# Patient Record
(demographics unavailable — no encounter records)

---

## 2025-01-03 NOTE — HISTORY OF PRESENT ILLNESS
[de-identified] : Patient is a 81 year old male who presents today for an initial evaluation of neck pain and shoulder pain that has been present for 6 months. He denies radicular sxs. He reports that his pain is exacerbated by turning his neck. He states that he feels tired after washing dishes for five minutes and reports that he has to lay down to improve his pain. He denies any issues with balance or dropping objects.

## 2025-01-03 NOTE — PHYSICAL EXAM
[de-identified] :  Cervical Physical Exam   difficulty with tandem gait Gait - Normal   Station - Normal   Sagittal balance - Normal   Compensatory mechanism? - None   Horizontal gaze - Maintained     Reflexes Biceps - Normal Triceps - Normal Brachioradialis - Normal Patellar - Normal Gastroc - Normal Clonus -No   Hoffmans - positive bilateral   Shoulder Exam - Normal   Spurlings - None   Wrist Pulses -2+ radial/ulnar   Foot Pulses -2+ DP/PT   Cervical range of motion - Normal   Sensation C5-T1 sensation intact to light touch bilaterally   L1-S1 sensation intact to light touch bilaterally   Motor   Deltoid Biceps Triceps WF WE IO  Right 5/5 5/5 3+/5 5/5 5/5 5/5 4/5 Left 5/5 5/5 3+/5 5/5 5/5 5/5 4/5   IP Quad HS TA Gastroc EHL Right 5/5 5/5 5/5 5/5 5/5 5/5 Left 5/5 5/5 5/5 5/5 5/5 5/5 [de-identified] : cervical radiographs osteoarthritis

## 2025-01-03 NOTE — ASSESSMENT
[FreeTextEntry1] :  I had a lengthy discussion with the patient in regards to the treatment plan and diagnosis. I am concerned in regards to compression along the patient's spinal cord and/or nerve roots.  As a result I would like to proceed with an MRI of the patient's cervical spine.  In tandem with this the patient should begin physical therapy focused on their neck and the patient can take Diclofenac, Tizanidine and Tylenol. I will have the patient follow-up in 2 to 3 weeks for repeat clinical evaluation.  I encouraged the patient to reach out to me directly at any point if their symptoms worsen or change in any way.

## 2025-01-03 NOTE — ADDENDUM
[FreeTextEntry1] :  I, Viola Kerr, acted solely as a scribe for Dr. Kashif Quinones MD on this date 01/03/2025    All medical record entries made by the Scribe were at my, Dr. Kashif Quinones MD., direction and personally dictated by me on 01/03/2025 . I have reviewed the chart and agree that the record accurately reflects my personal performance of the history, physical exam, assessment and plan. I have also personally directed, reviewed, and agreed with the chart.

## 2025-01-19 NOTE — HISTORY OF PRESENT ILLNESS
[de-identified] : Patient is a 81 year old male who presents today for a follow up evaluation of neck pain and shoulder pain that has been present for 6 months. He reports neck pain when turning his head. Patient reports that his pain is exacerbated by sitting straight in the car. He presents in a cervical collar. When he is in the collar, pain is controlled.  01.03.25 Patient is a 81 year old male who presents today for an initial evaluation of neck pain and shoulder pain that has been present for 6 months. He denies radicular sxs. He reports that his pain is exacerbated by turning his neck. He states that he feels tired after washing dishes for five minutes and reports that he has to lay down to improve his pain. He denies any issues with balance or dropping objects.

## 2025-01-19 NOTE — DISCUSSION/SUMMARY
[FreeTextEntry1] : Spoke with family, will send to [facility] for further workup of lesion noted on MRI. Family will bring patient in tomorrow. All questions answered

## 2025-01-19 NOTE — HISTORY OF PRESENT ILLNESS
[de-identified] : Patient is a 81 year old male who presents today for a follow up evaluation of neck pain and shoulder pain that has been present for 6 months. He reports neck pain when turning his head. Patient reports that his pain is exacerbated by sitting straight in the car. He presents in a cervical collar. When he is in the collar, pain is controlled.  01.03.25 Patient is a 81 year old male who presents today for an initial evaluation of neck pain and shoulder pain that has been present for 6 months. He denies radicular sxs. He reports that his pain is exacerbated by turning his neck. He states that he feels tired after washing dishes for five minutes and reports that he has to lay down to improve his pain. He denies any issues with balance or dropping objects.

## 2025-01-19 NOTE — ADDENDUM
[FreeTextEntry1] :  I, Viola Kerr, acted solely as a scribe for Dr. Kashif Quinones MD on this date 01/17/2025    All medical record entries made by the Scribe were at my, Dr. Kashif Quinones MD., direction and personally dictated by me on 01/17/2025 . I have reviewed the chart and agree that the record accurately reflects my personal performance of the history, physical exam, assessment and plan. I have also personally directed, reviewed, and agreed with the chart.

## 2025-01-19 NOTE — PHYSICAL EXAM
[de-identified] :  Cervical Physical Exam   Nml gait   Station - Normal   Sagittal balance - Normal   Compensatory mechanism? - None   Horizontal gaze - Maintained     Reflexes Biceps - Normal Triceps - Normal Brachioradialis - Normal Patellar - Normal Gastroc - Normal Clonus -No   Hoffmans - positive bilateral   Shoulder Exam - Normal   Spurlings - None   Wrist Pulses -2+ radial/ulnar   Foot Pulses -2+ DP/PT   Cervical range of motion - in collar   Sensation C5-T1 sensation intact to light touch bilaterally   L1-S1 sensation intact to light touch bilaterally   Motor   Deltoid Biceps Triceps WF WE IO  Right 5/5 5/5 5/5 5/5 5/5 5/5 5/5 Left 5/5 5/5 5/5 5/5 5/5 5/5 5/5   IP Quad HS TA Gastroc EHL Right 5/5 5/5 5/5 5/5 5/5 5/5 Left 5/5 5/5 5/5 5/5 5/5 5/5 [de-identified] : cervical MRI C1/C2 lesion noted

## 2025-01-19 NOTE — DISCUSSION/SUMMARY
[FreeTextEntry1] : Spoke with family, will send to [facility] for further workup of lesion noted on MRI. Family will bring patient in tomorrow. All questions answered

## 2025-01-19 NOTE — PHYSICAL EXAM
[de-identified] :  Cervical Physical Exam   Nml gait   Station - Normal   Sagittal balance - Normal   Compensatory mechanism? - None   Horizontal gaze - Maintained     Reflexes Biceps - Normal Triceps - Normal Brachioradialis - Normal Patellar - Normal Gastroc - Normal Clonus -No   Hoffmans - positive bilateral   Shoulder Exam - Normal   Spurlings - None   Wrist Pulses -2+ radial/ulnar   Foot Pulses -2+ DP/PT   Cervical range of motion - in collar   Sensation C5-T1 sensation intact to light touch bilaterally   L1-S1 sensation intact to light touch bilaterally   Motor   Deltoid Biceps Triceps WF WE IO  Right 5/5 5/5 5/5 5/5 5/5 5/5 5/5 Left 5/5 5/5 5/5 5/5 5/5 5/5 5/5   IP Quad HS TA Gastroc EHL Right 5/5 5/5 5/5 5/5 5/5 5/5 Left 5/5 5/5 5/5 5/5 5/5 5/5 [de-identified] : cervical MRI C1/C2 lesion noted

## 2025-01-19 NOTE — ASSESSMENT
[FreeTextEntry1] :  I had a lengthy discussion with the patient in regards to the treatment plan and diagnosis. At this point we will await pathology results. Discussed transitioning care to neurosurgery pending pathology results. Discussed red flag findings that if present would require immediate return to the hospital. Patient and family appreciative of care.

## 2025-02-04 NOTE — ASSESSMENT
[FreeTextEntry1] : Mr. Peter Castaneda is an 81-year-old man diagnosed with a cervical tumor and inflammatory pannus, presenting with severe neck pain but an intact neurological exam.   Imaging and diagnostic workup evaluation show evidence of There are destructive changes involving the dens with suspicion for superimposed nondisplaced pathologic fracture. Expansile/soft tissue component involving the left posterior dense results in mild to moderate central canal narrowing. Differential considerations include inflammatory etiology as well as possible neoplasm. Comparison with prior imaging and imaging follow-up is recommended. There is T2 signal hyperintensity within the cervical cord extending from the C3-C4 level through the C4-C5 level, likely keeping with myelomalacia. Multilevel discogenic degenerative disease and facet arthropathy of the cervical spine, most pronounced at C4-C5 and C5-C6 where there is moderate bilateral neural foraminal narrowing and moderate central canal narrowing. Additional varying degrees of central canal and neural foraminal narrowing, as above.   Plan:  -CT Guided Biopsy by Dr. Javier Chauhan -MRI CERVICAL SPINE at C1-C2 Cranio cervical junction -CTA HEAD and NECK assess vascular  -Cervical Xray to assess for instability flex ext and open mouth views -Lymphoma labs   Follow up: after completion of Biopsy and diagnostic results   Please see Dr. Cerda's dictation for details. I, Dr. Valencia Cerda evaluated the patient with the nurse practitioner Guanakito Diggs and established the plan of care. I discussed this patient with the nurse practitioner during the visit. I agree with the assessment and plan as written unless noted below.

## 2025-02-04 NOTE — HISTORY OF PRESENT ILLNESS
[FreeTextEntry1] : Neck pain [de-identified] : Mr. Peter Castaneda is an 81-year-old man with a medical history that includes hypertension (HTN), glaucoma, and rheumatoid arthritis (RA). He is presenting for a comprehensive neurosurgical evaluation of his cervical spine following a request from the hospital for follow-up care.  Today, the patient reports that his symptoms began while he was on vacation in his native country, [place]. He is currently wearing a rigid cervical collar. He notes that his neck pain worsens with movement and significantly impacts his quality of life (QOL).   Hospital Course: Discharge Date 14-Jan-2025 Admission Date 08-Jan-2025 14:03 Reason for Admission Workup for possible c-spine neoplasm Hospital Course  80yo male with PMH of HTN, glaucoma, and RA, presented to outpatient ortho with neck pain x 6months, found to have possible neoplasm in c-spine, admitted for further workup. MRI from outpatient notable for destructive changes in the dens suspicious for superimposed nondisplaced pathologic fracture with expansile/soft tissue component involving the L posterior dens. Ortho was consulted, no surgical intervention indicated. Patient was placed in a c-collar. Onc was consulted, CT C/A/P showed enlarged bilateral axillary and subpectoral LN suspicious for possible mets. Rad onc consulted for palliative RT, IR consulted for biopsy. MRI/C/T/L spine with contrast showing pathologic C2 fracture with epidural paraspinal extension, MRI brain no evidence of intracranial mets. He received US guided axillary LN biopsy with IR on 1/14. Patient was continued on his home hypertension meds including amlodipine 10mg qd and lisinopril 20mg qd. On day of discharge, patient is clinically stable with no new exam findings or acute symptoms compared to prior. The patient was seen by the attending physician on the date of discharge and deemed stable and acceptable for discharge. The patient's chronic medical conditions were treated accordingly per the patient's home medication regimen. The patient's medication reconciliation (with changes made to chronic medications), follow up appointments, discharge orders, instructions, and significant lab and diagnostic studies are as noted.

## 2025-02-04 NOTE — HISTORY OF PRESENT ILLNESS
[FreeTextEntry1] : Neck pain [de-identified] : Mr. Peter Castaneda is an 81-year-old man with a medical history that includes hypertension (HTN), glaucoma, and rheumatoid arthritis (RA). He is presenting for a comprehensive neurosurgical evaluation of his cervical spine following a request from the hospital for follow-up care.  Today, the patient reports that his symptoms began while he was on vacation in his native country, [facility]. He is currently wearing a rigid cervical collar. He notes that his neck pain worsens with movement and significantly impacts his quality of life (QOL).   Hospital Course: Discharge Date 14-Jan-2025 Admission Date 08-Jan-2025 14:03 Reason for Admission Workup for possible c-spine neoplasm Hospital Course  80yo male with PMH of HTN, glaucoma, and RA, presented to outpatient ortho with neck pain x 6months, found to have possible neoplasm in c-spine, admitted for further workup. MRI from outpatient notable for destructive changes in the dens suspicious for superimposed nondisplaced pathologic fracture with expansile/soft tissue component involving the L posterior dens. Ortho was consulted, no surgical intervention indicated. Patient was placed in a c-collar. Onc was consulted, CT C/A/P showed enlarged bilateral axillary and subpectoral LN suspicious for possible mets. Rad onc consulted for palliative RT, IR consulted for biopsy. MRI/C/T/L spine with contrast showing pathologic C2 fracture with epidural paraspinal extension, MRI brain no evidence of intracranial mets. He received US guided axillary LN biopsy with IR on 1/14. Patient was continued on his home hypertension meds including amlodipine 10mg qd and lisinopril 20mg qd. On day of discharge, patient is clinically stable with no new exam findings or acute symptoms compared to prior. The patient was seen by the attending physician on the date of discharge and deemed stable and acceptable for discharge. The patient's chronic medical conditions were treated accordingly per the patient's home medication regimen. The patient's medication reconciliation (with changes made to chronic medications), follow up appointments, discharge orders, instructions, and significant lab and diagnostic studies are as noted.

## 2025-02-04 NOTE — PHYSICAL EXAM
[General Appearance - Alert] : alert [General Appearance - In No Acute Distress] : in no acute distress [Oriented To Time, Place, And Person] : oriented to person, place, and time [No Visual Abnormalities] : no visible abnormalities [Neck Appearance] : the appearance of the neck was normal [] : no respiratory distress [Heart Rate And Rhythm] : heart rate was normal and rhythm regular [Abnormal Walk] : normal gait

## 2025-02-04 NOTE — HISTORY OF PRESENT ILLNESS
[FreeTextEntry1] : Neck pain [de-identified] : Mr. Peter Castaneda is an 81-year-old man with a medical history that includes hypertension (HTN), glaucoma, and rheumatoid arthritis (RA). He is presenting for a comprehensive neurosurgical evaluation of his cervical spine following a request from the hospital for follow-up care.  Today, the patient reports that his symptoms began while he was on vacation in his native country, [facility]. He is currently wearing a rigid cervical collar. He notes that his neck pain worsens with movement and significantly impacts his quality of life (QOL).   Hospital Course: Discharge Date 14-Jan-2025 Admission Date 08-Jan-2025 14:03 Reason for Admission Workup for possible c-spine neoplasm Hospital Course  82yo male with PMH of HTN, glaucoma, and RA, presented to outpatient ortho with neck pain x 6months, found to have possible neoplasm in c-spine, admitted for further workup. MRI from outpatient notable for destructive changes in the dens suspicious for superimposed nondisplaced pathologic fracture with expansile/soft tissue component involving the L posterior dens. Ortho was consulted, no surgical intervention indicated. Patient was placed in a c-collar. Onc was consulted, CT C/A/P showed enlarged bilateral axillary and subpectoral LN suspicious for possible mets. Rad onc consulted for palliative RT, IR consulted for biopsy. MRI/C/T/L spine with contrast showing pathologic C2 fracture with epidural paraspinal extension, MRI brain no evidence of intracranial mets. He received US guided axillary LN biopsy with IR on 1/14. Patient was continued on his home hypertension meds including amlodipine 10mg qd and lisinopril 20mg qd. On day of discharge, patient is clinically stable with no new exam findings or acute symptoms compared to prior. The patient was seen by the attending physician on the date of discharge and deemed stable and acceptable for discharge. The patient's chronic medical conditions were treated accordingly per the patient's home medication regimen. The patient's medication reconciliation (with changes made to chronic medications), follow up appointments, discharge orders, instructions, and significant lab and diagnostic studies are as noted.

## 2025-02-21 NOTE — HISTORY OF PRESENT ILLNESS
[FreeTextEntry1] : 82yo male with PMH of HTN, glaucoma, and RA. Patient went to ortho for neck pain x 6months, found to have possible neoplasm in c-spine.  Patient is being referred by Dr Diggs for Vertebrae Biopsy Consultation. Patient denies Chst pain, Fever, Chills, N/V/D???? ADL's??? DM use??? PST Diclofenac hold 24 hours prior to procedure???  MR Cervical Spine 1/3/25 There are destructive changes involving the dens with suspicion for superimposed nondisplaced pathologic fracture. Expansile/soft tissue component involving the left posterior dense results in mild to moderate central canal narrowing. Differential considerations include inflammatory etiology as well as possible neoplasm. Comparison with prior imaging and imaging follow-up is recommended. There is T2 signal hyperintensity within the cervical cord extending from the C3-C4 level through the C4-C5 level, likely keeping with myelomalacia. Multilevel discogenic degenerative disease and facet arthropathy of the cervical spine, most pronounced at C4-C5 and C5-C6 where there is moderate bilateral neural foraminal narrowing and moderate central canal narrowing. Additional varying degrees of central canal and neural foraminal narrowing, as above.  MR of spine w/wo contrast 2/13/25 There is grossly no significant interval change in degree of epidural/paraspinal soft tissue at the atlantodental articulation with associated destructive changes of the dens and C1 arches. Soft tissue again is slightly asymmetrically more pronounced on the left and causes mild to moderate mass effect on the left thecal sac. Differential considerations include sequela of pannus formation in the setting of an inflammatory condition such as rheumatoid arthritis. Additional etiologies cannot be entirely excluded, and continued surveillance may be of utility. Multilevel discogenic degenerative disease and facet arthropathy of the cervical spine, resulting in varying degrees of central canal and neural foraminal narrowing, unchanged as compared to 1/10/2025.

## 2025-02-21 NOTE — DATA REVIEWED
[FreeTextEntry1] : CT Angio neck and MR of Cervical spine reviewed with patient and all questions answered.

## 2025-03-03 NOTE — HISTORY OF PRESENT ILLNESS
[FreeTextEntry1] : 82yo male with PMH of HTN, glaucoma, and RA. Patient went to ortho for neck pain since August 2024, found to have possible neoplasm in c-spine.  Neck pain worsen w/movement. Neck Collar in Use remove for ADL and when eating.  Patient is being referred by Dr Diggs for Vertebrae Biopsy Consultation. Patient denies Chest pain, Fever, Chills, N/V/D. Patient is taking aspirin on his own, Patient hold aspirin 5 days prior to procedure. PST ADL self-care, No DME  MR Cervical Spine 1/3/25 There are destructive changes involving the dens with suspicion for superimposed nondisplaced pathologic fracture. Expansile/soft tissue component involving the left posterior dense results in mild to moderate central canal narrowing. Differential considerations include inflammatory etiology as well as possible neoplasm. Comparison with prior imaging and imaging follow-up is recommended. There is T2 signal hyperintensity within the cervical cord extending from the C3-C4 level through the C4-C5 level, likely keeping with myelomalacia. Multilevel discogenic degenerative disease and facet arthropathy of the cervical spine, most pronounced at C4-C5 and C5-C6 where there is moderate bilateral neural foraminal narrowing and moderate central canal narrowing. Additional varying degrees of central canal and neural foraminal narrowing, as above.  MR of spine w/wo contrast 2/13/25 There is grossly no significant interval change in degree of epidural/paraspinal soft tissue at the atlantodental articulation with associated destructive changes of the dens and C1 arches. Soft tissue again is slightly asymmetrically more pronounced on the left and causes mild to moderate mass effect on the left thecal sac. Differential considerations include sequela of pannus formation in the setting of an inflammatory condition such as rheumatoid arthritis. Additional etiologies cannot be entirely excluded, and continued surveillance may be of utility. Multilevel discogenic degenerative disease and facet arthropathy of the cervical spine, resulting in varying degrees of central canal and neural foraminal narrowing, unchanged as compared to 1/10/2025. [de-identified] : 2/13/2025 angio head and neck [de-identified] : 2/13/2025, 1/10/2025, 1/3/2025 cervical spine

## 2025-03-03 NOTE — CONSULT LETTER
[Consult Letter:] : I had the pleasure of evaluating your patient, [unfilled]. [Please see my note below.] : Please see my note below. [Consult Closing:] : Thank you very much for allowing me to participate in the care of this patient.  If you have any questions, please do not hesitate to contact me. [Sincerely,] : Sincerely, [FreeTextEntry3] :  Javier Chauhan MD, FACR, FSIR , Interventional Radiology Residency Associate , Diagnostic Radiology Residency Assistant Professor of Radiology, [employer] at [facility] Vascular & Interventional Radiology, [facility] P: [phone] F: [phone] [facility] P: [phone] F: [phone]

## 2025-03-03 NOTE — CONSULT LETTER
[Consult Letter:] : I had the pleasure of evaluating your patient, [unfilled]. [Please see my note below.] : Please see my note below. [Consult Closing:] : Thank you very much for allowing me to participate in the care of this patient.  If you have any questions, please do not hesitate to contact me. [Sincerely,] : Sincerely, [FreeTextEntry3] :  Javier Chauhan MD, FACR, FSIR , Interventional Radiology Residency Associate , Diagnostic Radiology Residency Assistant Professor of Radiology, [employer] at [facility] Vascular & Interventional Radiology, [facility] P: [phone] F: [phone] [facility] P: [phone] F: [phone]

## 2025-03-03 NOTE — REVIEW OF SYSTEMS
[Fever] : no fever [Chills] : no chills [Nosebleeds] : no nosebleeds [Chest Pain] : no chest pain [Palpitations] : no palpitations [Shortness Of Breath] : no shortness of breath [Wheezing] : no wheezing [Cough] : no cough [Abdominal Pain] : no abdominal pain [Vomiting] : no vomiting [Diarrhea] : no diarrhea [Easy Bleeding] : no tendency for easy bleeding [Easy Bruising] : no tendency for easy bruising

## 2025-03-03 NOTE — ASSESSMENT
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  All questions asked and answered to completion and the patient and his wife's satisfaction.  Risks, benefits, alternatives to the procedure were discussed with the patient and informed consent was obtained.  Plan: 1.  Biopsy of C1-C2 mass. 2.  CT guidance with CT angiogram at the time of the procedure.  3.  General anesthesia by anesthesiology. 4.  Prone positioning with left posterior/posterior lateral approach. 5.  Hold aspirin 5 days prior and 2 days post biopsy. 6.  Will obtain cervical spine clearance from referring neurosurgeon.

## 2025-03-03 NOTE — CONSULT LETTER
[Consult Letter:] : I had the pleasure of evaluating your patient, [unfilled]. [Please see my note below.] : Please see my note below. [Consult Closing:] : Thank you very much for allowing me to participate in the care of this patient.  If you have any questions, please do not hesitate to contact me. [Sincerely,] : Sincerely, [FreeTextEntry3] :  Javier Chauhan MD, FACR, FSIR , Interventional Radiology Residency Associate , Diagnostic Radiology Residency Assistant Professor of Radiology, [employer] at [facility] Vascular & Interventional Radiology, [facility] P: [phone] F: [phone] [facility] P: [phone] F: [phone]

## 2025-03-03 NOTE — HISTORY OF PRESENT ILLNESS
[FreeTextEntry1] : 82yo male with PMH of HTN, glaucoma, and RA. Patient went to ortho for neck pain since August 2024, found to have possible neoplasm in c-spine.  Neck pain worsen w/movement. Neck Collar in Use remove for ADL and when eating.  Patient is being referred by Dr Diggs for Vertebrae Biopsy Consultation. Patient denies Chest pain, Fever, Chills, N/V/D. Patient is taking aspirin on his own, Patient hold aspirin 5 days prior to procedure. PST ADL self-care, No DME  MR Cervical Spine 1/3/25 There are destructive changes involving the dens with suspicion for superimposed nondisplaced pathologic fracture. Expansile/soft tissue component involving the left posterior dense results in mild to moderate central canal narrowing. Differential considerations include inflammatory etiology as well as possible neoplasm. Comparison with prior imaging and imaging follow-up is recommended. There is T2 signal hyperintensity within the cervical cord extending from the C3-C4 level through the C4-C5 level, likely keeping with myelomalacia. Multilevel discogenic degenerative disease and facet arthropathy of the cervical spine, most pronounced at C4-C5 and C5-C6 where there is moderate bilateral neural foraminal narrowing and moderate central canal narrowing. Additional varying degrees of central canal and neural foraminal narrowing, as above.  MR of spine w/wo contrast 2/13/25 There is grossly no significant interval change in degree of epidural/paraspinal soft tissue at the atlantodental articulation with associated destructive changes of the dens and C1 arches. Soft tissue again is slightly asymmetrically more pronounced on the left and causes mild to moderate mass effect on the left thecal sac. Differential considerations include sequela of pannus formation in the setting of an inflammatory condition such as rheumatoid arthritis. Additional etiologies cannot be entirely excluded, and continued surveillance may be of utility. Multilevel discogenic degenerative disease and facet arthropathy of the cervical spine, resulting in varying degrees of central canal and neural foraminal narrowing, unchanged as compared to 1/10/2025. [de-identified] : 2/13/2025 angio head and neck [de-identified] : 2/13/2025, 1/10/2025, 1/3/2025 cervical spine

## 2025-03-03 NOTE — HISTORY OF PRESENT ILLNESS
[FreeTextEntry1] : 82yo male with PMH of HTN, glaucoma, and RA. Patient went to ortho for neck pain since August 2024, found to have possible neoplasm in c-spine.  Neck pain worsen w/movement. Neck Collar in Use remove for ADL and when eating.  Patient is being referred by Dr Diggs for Vertebrae Biopsy Consultation. Patient denies Chest pain, Fever, Chills, N/V/D. Patient is taking aspirin on his own, Patient hold aspirin 5 days prior to procedure. PST ADL self-care, No DME  MR Cervical Spine 1/3/25 There are destructive changes involving the dens with suspicion for superimposed nondisplaced pathologic fracture. Expansile/soft tissue component involving the left posterior dense results in mild to moderate central canal narrowing. Differential considerations include inflammatory etiology as well as possible neoplasm. Comparison with prior imaging and imaging follow-up is recommended. There is T2 signal hyperintensity within the cervical cord extending from the C3-C4 level through the C4-C5 level, likely keeping with myelomalacia. Multilevel discogenic degenerative disease and facet arthropathy of the cervical spine, most pronounced at C4-C5 and C5-C6 where there is moderate bilateral neural foraminal narrowing and moderate central canal narrowing. Additional varying degrees of central canal and neural foraminal narrowing, as above.  MR of spine w/wo contrast 2/13/25 There is grossly no significant interval change in degree of epidural/paraspinal soft tissue at the atlantodental articulation with associated destructive changes of the dens and C1 arches. Soft tissue again is slightly asymmetrically more pronounced on the left and causes mild to moderate mass effect on the left thecal sac. Differential considerations include sequela of pannus formation in the setting of an inflammatory condition such as rheumatoid arthritis. Additional etiologies cannot be entirely excluded, and continued surveillance may be of utility. Multilevel discogenic degenerative disease and facet arthropathy of the cervical spine, resulting in varying degrees of central canal and neural foraminal narrowing, unchanged as compared to 1/10/2025. [de-identified] : 2/13/2025 angio head and neck [de-identified] : 2/13/2025, 1/10/2025, 1/3/2025 cervical spine

## 2025-03-03 NOTE — PHYSICAL EXAM
[Alert] : alert [No Respiratory Distress] : no respiratory distress [Normal Rate] : heart rate was normal  [Normal Gait] : normal gait [Oriented x3] : oriented to person, place, and time

## 2025-04-23 NOTE — HISTORY OF PRESENT ILLNESS
[FreeTextEntry1] : 81-year-old male with no significant past medical history who presented for evaluation of newly discovered left sided C1-2 lesion. Patient had developed some mechanical neck pain as well as some radiating pain in the left occipital area in the past few months. Patient had inpatient workup including systemic staging scans which were negative. He also had MRI of the brain, cervical, thoracic and lumbar spine w/wo which did not reveal any other lesions. Patient is now status post CT guide core biopsy of the C1-2 mass on 3/21/25. 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 Additional imaging studies including MRI cervical spine, CTA head/neck and x-ray cervical spine completed on 2/13/25.

## 2025-04-23 NOTE — ASSESSMENT
[FreeTextEntry1] : 80 y/o M, with newly discovered left sided C1-2 lesion, neck pain and some radiation pain to left occipital region. Patient had inpatient workup including systemic staging scans which were negative. He also had MRI of the brain, cervical, thoracic and lumbar spine w/wo which did not reveal any other lesions.   Patient is now status post CT guide core biopsy of the C1-2 mass on 3/21/25. 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  Additional imaging studies including MRI cervical spine, CTA head/neck and x-ray cervical spine completed on 2/13/25. Overall, he is doing well. Has been wearing Collar.  Will refer him to rheumatology, will obtain x-ray AP/lateral, flexion/extension and open mouth views. Follow up in 3 months.     Please see Dr. Cerda's dictation for details. I, Dr. Valencia Cerda evaluated the patient with the PA Alex Rodriguez and established the plan of care. I personally discuss this patient with the PA at the time of the visit. I agree with the assessment and plan as written, unless noted below.

## 2025-06-23 NOTE — DATA REVIEWED
[FreeTextEntry1] : Available notes, and pertinent labs & imaging in EMR reviewed. Paper records reviewed, scanned to EMR. Pertinent labs and imaging summarized in HPI or A/P.  Imaging: CT Chest 1/2025  IMPRESSION: Prominent bilateral bronchiectasis and honeycombing with peripheral  reticular opacities consistent with fibrotic disease. Enlarged bilateral axillary and subpectoral lymph nodes. These are  indeterminant. Metastatic disease is not excluded. Enlarged left lobe thyroid gland. Recommend thyroid ultrasound.  Xray C spine 1/2025 flexion and extension  IMPRESSION: Comminuted fracture of the dens is better seen on prior cross-sectional imaging. There is 8 mm rightward lateralization at the right lateral mass. There is straightening of expected cervical lordosis. There is mild retrolisthesis at C3-C4 and C4-C5. There is no evidence of dynamic instability with flexion or extension. Moderate multilevel degenerative disc disease with disc space narrowing and endplate osteophyte formation. Multilevel neural foraminal narrowing appearing most advanced at the C3-C4, C4-C5, C5-C6 levels on the left. Neural foramina on the right are relatively preserved. The included lung apices are clear. The paravertebral soft tissues are within normal limits.  MRI C/T/L spine 1/2025  IMPRESSION: There are destructive changes involving the dens with suspicion for superimposed nondisplaced pathologic fracture. Expansile/soft tissue component involving the left posterior dense results in mild to moderate central canal narrowing. Differential considerations include inflammatory etiology as well as possible neoplasm. Comparison with prior imaging and imaging follow-up is recommended. There is T2 signal hyperintensity within the cervical cord extending from the C3-C4 level through the C4-C5 level, likely keeping with myelomalacia. Multilevel discogenic degenerative disease and facet arthropathy of the cervical spine, most pronounced at C4-C5 and C5-C6 where there is moderate bilateral neural foraminal narrowing and moderate central canal narrowing. Additional varying degrees of central canal and neural foraminal narrowing, as above.  MRI Head 1/2025  IMPRESSION: No evidence of intracranial metastatic disease. Multiple nonspecific abnormal white matter foci of T2/FLAIR prolongation  statistically favoring microvascular type changes.  CT Angio head and neck 2/13/2025  IMPRESSION: CT HEAD: No acute intracranial hemorrhage, mass effect, or midline shift. CTA NECK: Severe vertebral artery stenosis and luminal irregularity involving the left upper V2/V3 segments, most suggestive of age-indeterminate arterial dissection. Alternatively, findings may reflect stenotic changes related to chronic mass effect from surrounding epidural soft tissue mass. Additional area of focal severe stenosis involving the mid left V1 segment may represent additional site of arterial dissection versus underlying atherosclerotic change. Moderate to severe stenosis at the origins of the bilateral vertebral arteries secondary to calcified plaque. Redemonstration of indeterminate minimally enhancing epidural soft tissue at the C1-C2 junction, with subjacent scalloping/osseous remodeling and posterolateral atlantoaxial subluxation. 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 Other etiologies are not entirely excluded and follow-up is recommended. No hemodynamically flow-limiting carotid stenosis. CTA HEAD: Mild to moderate stenosis of the bilateral distal cavernous and supraclinoid ICA segments. No large vessel occlusion or aneurysm identified.  MRI C spine 2/13/25  IMPRESSION: There is grossly no significant interval change in degree of epidural/paraspinal soft tissue at the atlantodental articulation with associated destructive changes of the dens and C1 arches. Soft tissue again is slightly asymmetrically more pronounced on the left and causes mild to moderate mass effect on the left thecal sac. Differential considerations include sequela of pannus formation in the setting of an inflammatory condition such as rheumatoid arthritis. Additional etiologies cannot be entirely excluded, and continued surveillance may be of utility. Multilevel discogenic degenerative disease and facet arthropathy of the cervical spine, resulting in varying degrees of central canal and neural foraminal narrowing, unchanged as compared to 1/10/2025.  CT Core bx neck 3/21/25  Fine Needle Aspiration Addendum Report - Auth (Verified) Addendum Vertebral column, C1-C2 soft tissue soft tissue mass, core biopsy -   Multiple fragments of hyperplastic synovial tissue and granulation tissue with surface fibrinous exudate and small fragments of bony detritus consistent with pannus culture negative for acid fast bacilli

## 2025-06-23 NOTE — PHYSICAL EXAM
[General Appearance - Alert] : alert [General Appearance - In No Acute Distress] : in no acute distress [Extraocular Movements] : extraocular movements were intact [Neck Appearance] : the appearance of the neck was normal [] : no rash [FreeTextEntry1] : bilateral swan neck deformities and z thumbs, nodule felt on left olecranon bursa and right 2nd MCP, ventral side bilateral wrist swelling and fullness R>L

## 2025-06-23 NOTE — HISTORY OF PRESENT ILLNESS
[FreeTextEntry1] : Rheumatology Consultation Note   History of Present Illness: JOHN LOZOYA is a 81-year-old male with no significant past medical history who presented for evaluation of newly discovered left sided C1-2 lesion back on 1/2025 with ortho spine. Patient had developed some mechanical neck pain as well as some radiating pain in the left occipital area in the past few months. Patient had inpatient workup including systemic staging scans which were negative. He also had MRI of the brain, cervical, thoracic and lumbar spine w/wo which did not reveal any other lesions. Patient is now status post CT guide core biopsy of the C1-2 mass on 3/21/25. 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 Additional imaging studies including MRI cervical spine, CTA head/neck and x-ray cervical spine completed on 2/13/25. CT chest back in 1/2025 did mention fibrotic changes in the lung.   Per discussion with patient and daughter, maybe was diagnosed with RA possibly 20-30 years ago by outside rheum in [place]. Was offered prednisone back then, but pt declined to take it and seems to have never followed back up. He has never been on a DMARD or biologic.   Noticed 3 weeks ago some wrist swelling that was new. Has chronic hand deformities that have been there for years he says.  Currently for his neck, he states that after getting out of the cervical collar still has some pain and stiffness but better then when he was in the hospital in January 2025.    Social Hx: no smoking hx, no etoh usage, no recreational drug use Family Hx: no hx of AI disease  Inflammatory arthritis ROS negative for fevers, chills, prolonged AM stiffness, enthesitis, dactylitis, psoriasis/ rashes, eye inflammation, inflammatory low back pain, IBD.

## 2025-06-23 NOTE — ASSESSMENT
[FreeTextEntry1] : Assessment: #Past dx of RA supposedly 20-30 years ago by outside rheum in [place] -was offered prednisone but pt never took, never followed up again -never been on DMARD or biologic -presented with worsening neck pain and numbness on 1/2025 to ortho, work up done showing C1-C2 lesion along with destructive changes and superimposed nondisplaced pathologic fracture -bx of lesion on 3/2025 consistent with pannus (negative for malignancy and infection) staging done in hospital with CT Chest showing fibrotic changes and bronchiectasis  -on initial exam with chronic swan neck deformities and concern for subcutaneous nodules in left elbow and right 2nd MCP  -follows with Dr. Cerda neurosurgery    Discussion: JOHN LOZOYA is a 81-year-old male with no significant past medical history who presented for evaluation of newly discovered left sided C1-2 lesion back on 1/2025 with ortho spine. Patient had developed some mechanical neck pain as well as some radiating pain in the left occipital area in the past few months. Patient had inpatient workup including systemic staging scans which were negative. He also had MRI of the brain, cervical, thoracic and lumbar spine w/wo which did not reveal any other lesions. Patient is now status post CT guide core biopsy of the C1-2 mass on 3/21/25. 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 Additional imaging studies including MRI cervical spine, CTA head/neck and x-ray cervical spine completed on 2/13/25. CT chest back in 1/2025 did mention fibrotic changes in the lung.   Based on available history, exam, and diagnostics patient's presentation seems to be most consistent with long standing rheumatoid arthritis causing now cervical instability along with possible ILD involvement and chronic arthritic changes.   Given the posisbility of ILD from CT chest back on 1/2025 in hospital, I recommend a HRCT to evaluate extent of possible. Should ILD be prominent, would have to consider possibly more aggressive immunosuppressives such as actemra vs rituxan. IF no ILD involvement, could consider agents such as MTX vs TNF vs JAKs. Explained this with patient and patient's daughter who expressed understanding. Will also get ID labs prepared.   Discussed that the goal of these medications is to prevent further damage from RA as the damage that is already done is irreversible. Also will likely continue to have a component of neck pain and stiffness given DJD also seen on spinal imaging.   He will need to absolutely continue to follow closely with neurosurgery given the aggressiveness of involvement. Pt and pt daughter aware, if pt develops any new worsening sudden neck pain/stiffness, numbness/tingling, visual changes, to go to nearest ED for evaluation.    Plan: -follow up HRCT, will need to consider pulm referral as well pending results  -follow up labs, xrays, and ultrasound of wrists -continue to follow neurosurgery    All questions and concerns addressed to my best possible ability, patient verbalizes understanding and is agreeable.   RTC in 3-4 weeks

## 2025-06-23 NOTE — HISTORY OF PRESENT ILLNESS
[FreeTextEntry1] : Rheumatology Consultation Note   History of Present Illness: JOHN LOZOYA is a 81-year-old male with no significant past medical history who presented for evaluation of newly discovered left sided C1-2 lesion back on 1/2025 with ortho spine. Patient had developed some mechanical neck pain as well as some radiating pain in the left occipital area in the past few months. Patient had inpatient workup including systemic staging scans which were negative. He also had MRI of the brain, cervical, thoracic and lumbar spine w/wo which did not reveal any other lesions. Patient is now status post CT guide core biopsy of the C1-2 mass on 3/21/25. 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 Additional imaging studies including MRI cervical spine, CTA head/neck and x-ray cervical spine completed on 2/13/25. CT chest back in 1/2025 did mention fibrotic changes in the lung.   Per discussion with patient and daughter, maybe was diagnosed with RA possibly 20-30 years ago by outside rheum in [place]. Was offered prednisone back then, but pt declined to take it and seems to have never followed back up. He has never been on a DMARD or biologic.   Noticed 3 weeks ago some wrist swelling that was new. Has chronic hand deformities that have been there for years he says.  Currently for his neck, he states that after getting out of the cervical collar still has some pain and stiffness but better then when he was in the hospital in January 2025.    Social Hx: no smoking hx, no etoh usage, no recreational drug use Family Hx: no hx of AI disease  Inflammatory arthritis ROS negative for fevers, chills, prolonged AM stiffness, enthesitis, dactylitis, psoriasis/ rashes, eye inflammation, inflammatory low back pain, IBD.

## 2025-06-23 NOTE — ASSESSMENT
[FreeTextEntry1] : Assessment: #Past dx of RA supposedly 20-30 years ago by outside rheum in [place] -was offered prednisone but pt never took, never followed up again -never been on DMARD or biologic -presented with worsening neck pain and numbness on 1/2025 to ortho, work up done showing C1-C2 lesion along with destructive changes and superimposed nondisplaced pathologic fracture -bx of lesion on 3/2025 consistent with pannus (negative for malignancy and infection) staging done in hospital with CT Chest showing fibrotic changes and bronchiectasis  -on initial exam with chronic swan neck deformities and concern for subcutaneous nodules in left elbow and right 2nd MCP  -follows with Dr. Cerda neurosurgery    Discussion: JOHN LOZOYA is a 81-year-old male with no significant past medical history who presented for evaluation of newly discovered left sided C1-2 lesion back on 1/2025 with ortho spine. Patient had developed some mechanical neck pain as well as some radiating pain in the left occipital area in the past few months. Patient had inpatient workup including systemic staging scans which were negative. He also had MRI of the brain, cervical, thoracic and lumbar spine w/wo which did not reveal any other lesions. Patient is now status post CT guide core biopsy of the C1-2 mass on 3/21/25. 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 Additional imaging studies including MRI cervical spine, CTA head/neck and x-ray cervical spine completed on 2/13/25. CT chest back in 1/2025 did mention fibrotic changes in the lung.   Based on available history, exam, and diagnostics patient's presentation seems to be most consistent with long standing rheumatoid arthritis causing now cervical instability along with possible ILD involvement and chronic arthritic changes.   Given the posisbility of ILD from CT chest back on 1/2025 in hospital, I recommend a HRCT to evaluate extent of possible. Should ILD be prominent, would have to consider possibly more aggressive immunosuppressives such as actemra vs rituxan. IF no ILD involvement, could consider agents such as MTX vs TNF vs JAKs. Explained this with patient and patient's daughter who expressed understanding. Will also get ID labs prepared.   Discussed that the goal of these medications is to prevent further damage from RA as the damage that is already done is irreversible. Also will likely continue to have a component of neck pain and stiffness given DJD also seen on spinal imaging.   He will need to absolutely continue to follow closely with neurosurgery given the aggressiveness of involvement. Pt and pt daughter aware, if pt develops any new worsening sudden neck pain/stiffness, numbness/tingling, visual changes, to go to nearest ED for evaluation.    Plan: -follow up HRCT, will need to consider pulm referral as well pending results  -follow up labs, xrays, and ultrasound of wrists -continue to follow neurosurgery    All questions and concerns addressed to my best possible ability, patient verbalizes understanding and is agreeable.   RTC in 3-4 weeks

## 2025-06-30 NOTE — HEALTH RISK ASSESSMENT
[Never (0 pts)] : Never (0 points) [No] : In the past 12 months have you used drugs other than those required for medical reasons? No [Former] : Former [0-4] : 0-4 [> 15 Years] : > 15 Years

## 2025-07-07 NOTE — DISCUSSION/SUMMARY
[FreeTextEntry1] : 81M  ECG with bifascicular block.  CT with atherosclerotic changes- not noted on chest portion of exam Cardiac risk factors   Will arrange for transthoracic echo to ensure normal left ventricular size and function and normal valvular function. Can consider CTA vs NST in the future. Asymptomatic at present  HTN: BP borderline. Continue with amlodipine 10mg, lisinopril 40mg. K 4.5, Crt 0.71. Home BP log.  HLD: Lipids well controlled on statin, LDL 91. Continue rosuvastatin 10mg   DM: A1c markedly elevated, 14.4%. Pending endo consult.   RV 6M

## 2025-07-07 NOTE — REVIEW OF SYSTEMS
[TextEntry] : 10 point review of systems is normal other than what is listed above in the history of present illness.

## 2025-07-07 NOTE — HISTORY OF PRESENT ILLNESS
[FreeTextEntry1] : 81M with HTN, HLD, bifasiscular block, poorly controlled DM  Accompanied today by Chelsea lira  Admitted to [facility] in January 20[facility] for neck pain  RBBB noted on ECG, CT A/P with atherosclerotic disease of vessels (not noted on chest portion of CT)  CT head and neck with nonobstructive cerebrovascular disease Referred to follow up with outpt cards   Feeling generally well  Denies chest pain, shortness of breath, palpitations, dizziness, lightheadedness, syncope. Able to climb stairs without exertive symptoms Used to be more active. Limited by orthopedic ailments.   Former smoker. Mother - CVA later in life, HTN. Father - HTN. Retired banker.   ECG: SR with bifascicular block   Asa 81mg  Rosuvastatin 10mg   Amlodipine 10mg  Lisinopril 40mg

## 2025-07-10 NOTE — ASSESSMENT
[FreeTextEntry1] : Mr. 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   #RA-ILD - discussed natural hx and possibility of disease progression Exercise oximetry on RA today: at rest - P: 87 O2 98%; with exertion - P: 109 O2 93% PFTs with moderate to severe restrictive defect, TLC 56%, normal DLCO Repeat CT chest pending -- RA treatment per Dr Mcneil --Will start antifibrotic therapy if there is evidence of disease progression based on imaging or PFTs  #LTBI -  Discussed indications for treatment. Given planned treatment for RA, would recommend treatment.  -- start Rifampin x 4 mo -- monitor LFTs and CBC  #HCM - PCV 21 today, Flu shot in the fall.   All questions answered. Patient in agreement with plan. Follow up via TEB in 4-6 weeks, in office in 3mo  or call sooner if needed.

## 2025-07-10 NOTE — PHYSICAL EXAM
[No Acute Distress] : no acute distress [Normal Oropharynx] : normal oropharynx [Normal Appearance] : normal appearance [No Neck Mass] : no neck mass [Normal Rate/Rhythm] : normal rate/rhythm [Normal S1, S2] : normal s1, s2 [No Murmurs] : no murmurs [No Resp Distress] : no resp distress [No Abnormalities] : no abnormalities [Benign] : benign [Normal Gait] : normal gait [No Clubbing] : no clubbing [No Cyanosis] : no cyanosis [No Edema] : no edema [FROM] : FROM [Normal Color/ Pigmentation] : normal color/ pigmentation [No Focal Deficits] : no focal deficits [Oriented x3] : oriented x3 [Normal Affect] : normal affect [TextBox_68] : dry crackles at the bases bilaterally

## 2025-07-10 NOTE — HISTORY OF PRESENT ILLNESS
[Former] : former [>= 20 pack years] : >= 20 pack years [Never] : never [TextBox_4] : Mr. 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   Initial Hx 7/2025.  here with daughter Albina. Daughter Aisha is on the phone Recently dx with RA, complicated by C1-C2 fracture.  While patient denies any respiratory sx, per daughter he is mostly sedentary, laying in his recliner. He walks around the store doing his shopping with no issues. He mild chronic cough/throat clearing during the day and night. Denies reflux/sinus issues/PND.      ROS: denies fevers, chills, night sweats, unintentional weight loss    PMH: HTN, vertebral artery stenosis, DM2, RA, ILD, C1-2 mass and fracture, LTBI Meds: per chart All: NKDA SH: former smoker, no known exposures FH: Denies family hx of pulmonary disease PMD: ANNA MARIE [facility]  [TextBox_11] : 1/4 [TextBox_13] : 30 [YearQuit] : 1994

## 2025-07-10 NOTE — CONSULT LETTER
[Dear  ___] : Dear  [unfilled], [Consult Letter:] : I had the pleasure of evaluating your patient, [unfilled]. [Please see my note below.] : Please see my note below. [Consult Closing:] : Thank you very much for allowing me to participate in the care of this patient.  If you have any questions, please do not hesitate to contact me. [FreeTextEntry3] : Sincerely,  Rebekah Sage MD [facility] tel: [phone] fax: [phone]

## 2025-07-14 NOTE — ASSESSMENT
[FreeTextEntry1] : 81M w/ RA-ILD, Vertebral artery stenosis, HTN, T2DM is coming in for management of HTN and T2DM management.   #T2DM -Strongly encouraged starting Insulin given uncontrolled A1C and marked hyperglycemia. Patient hesitant, states he will think about it -lantus 10 units qhs -Patient has upcoming Endo appt -ER precautions discussed with patient's daughter (lethargy, N/V, etc)  #HTN -Uncontrolled -C/w Amlodipine 10mg qd and lisinopril 40mg qd  -Start Hctz 12.5mg qd -Check CMP today given lisinopril increase at last visit and to check Na given we are starting HCTZ  RTC in 1 month to check BP

## 2025-07-14 NOTE — HISTORY OF PRESENT ILLNESS
[Family Member] : family member [FreeTextEntry1] : 81M w/ RA-ILD, Vertebral artery stenosis, HTN, T2DM is coming in for management of HTN and T2DM management.  [de-identified] : 81M w/ RA-ILD, Vertebral artery stenosis, HTN, T2DM is coming in for management of HTN and T2DM management.   Patient has been refusing to see Ortho for neck pain, needs C-collar  Has refused insulin, refused Metformin, trying to work on diet. Has upcoming appt w/ Endocrinologist  Otherwise feels well. Has been adherent to increased lisinopril.